# Patient Record
Sex: MALE | Race: WHITE | HISPANIC OR LATINO | Employment: FULL TIME | URBAN - METROPOLITAN AREA
[De-identification: names, ages, dates, MRNs, and addresses within clinical notes are randomized per-mention and may not be internally consistent; named-entity substitution may affect disease eponyms.]

---

## 2018-05-22 ENCOUNTER — SLEEP CENTER VISIT (OUTPATIENT)
Dept: SLEEP MEDICINE | Facility: MEDICAL CENTER | Age: 43
End: 2018-05-22
Payer: COMMERCIAL

## 2018-05-22 VITALS
HEART RATE: 69 BPM | BODY MASS INDEX: 31.78 KG/M2 | OXYGEN SATURATION: 97 % | WEIGHT: 227 LBS | SYSTOLIC BLOOD PRESSURE: 118 MMHG | HEIGHT: 71 IN | RESPIRATION RATE: 15 BRPM | DIASTOLIC BLOOD PRESSURE: 70 MMHG

## 2018-05-22 DIAGNOSIS — G47.00 INSOMNIA, UNSPECIFIED TYPE: ICD-10-CM

## 2018-05-22 DIAGNOSIS — G47.33 OSA (OBSTRUCTIVE SLEEP APNEA): ICD-10-CM

## 2018-05-22 PROCEDURE — 99203 OFFICE O/P NEW LOW 30 MIN: CPT | Performed by: NURSE PRACTITIONER

## 2018-05-22 RX ORDER — ZOLPIDEM TARTRATE 5 MG/1
5 TABLET ORAL NIGHTLY PRN
Qty: 3 TAB | Refills: 0 | Status: SHIPPED | OUTPATIENT
Start: 2018-05-22 | End: 2018-05-25

## 2018-05-22 RX ORDER — ESOMEPRAZOLE MAGNESIUM 40 MG/1
40 CAPSULE, DELAYED RELEASE ORAL
COMMUNITY

## 2018-05-22 RX ORDER — LISINOPRIL 10 MG/1
TABLET ORAL
Refills: 3 | COMMUNITY
Start: 2018-03-31

## 2018-05-22 RX ORDER — ATORVASTATIN CALCIUM 20 MG/1
TABLET, FILM COATED ORAL
Refills: 3 | COMMUNITY
Start: 2018-03-31

## 2018-05-22 NOTE — PROGRESS NOTES
Chief Complaint   Patient presents with   • New Patient     Dr. Chaparro        HPI:  Aniket Jason Callaway is a 42 y.o. year old male here today to establish care. He was referred by his PCP, Dr. Carter Chaparro to evaluate for sleep apnea. He had an overnight oximetry performed which per her report indicated borderline hypoxemia. I do not have records of this study. His BMI is 32. He has a history of snoring, witnessed apneas, non restorative sleep and daytime fatigue. He notes occasional morning headaches. Review of his sleep diary indicates that it often takes him 30 minutes to an hour to fall asleep. Once he falls asleep he wakes on average 1-2 times during the night. On his work days he will wake at 4 am. He is only averaging 4-5 hours of sleep on those nights. He is waking un refreshed and is tired during the day. He tries to limit naps. He states his insomnia is worse on the days he naps. He states he has had a history of insomnia and daytime fatigue for years. However it has been worse the past couple years. He denies symptoms to suggest Narcolepsy. He denies symptoms of RLS.       Past Medical History:   Diagnosis Date   • Apnea, sleep    • Daytime sleepiness    • Hyperlipidemia    • Hypertension    • Insomnia    • Morning headache    • Snoring     only when tired       History reviewed. No pertinent surgical history.    Family History   Problem Relation Age of Onset   • Sleep Apnea Neg Hx        Social History     Social History   • Marital status:      Spouse name: N/A   • Number of children: N/A   • Years of education: N/A     Occupational History   • Not on file.     Social History Main Topics   • Smoking status: Current Every Day Smoker     Packs/day: 0.25     Types: Cigarettes   • Smokeless tobacco: Never Used      Comment: 7-10 Q day    • Alcohol use Yes      Comment: 1 beer a week    • Drug use: No   • Sexual activity: Not on file     Other Topics Concern   • Not on file     Social History  "Narrative   • No narrative on file       ROS:  Constitutional: Denies fevers, chills, sweats, weight loss  Eyes: Denies vision loss, pain, drainage, double vision. Wears glasses   Ears/Nose/Mouth/Throat: Denies rhinitis, nasal congestion, ear ache, difficulty hearing, sore throat, persistent hoarseness, decayed teeth/toothache  Cardiovascular: Denies chest pain, tightness, palpitations, swelling in feet/legs, fainting, difficulty breathing when laying down  Respiratory: Denies shortness of breath, cough, sputum, wheezing, painful breathing, coughing up blood  GI: Denies heartburn, difficulty swallowing, nausea, vomiting, abdominal pain, diarrhea, constipation  : Denies frequent urination, painful urination  Integumentary: Denies rashes, lumps or color changes  MSK: Denies painful joints, sore muscles, and back pain.   Neurological: Denies frequent headaches, dizziness, weakness  Sleep: See HPI       Current Outpatient Prescriptions   Medication Sig Dispense Refill   • atorvastatin (LIPITOR) 20 MG Tab TAKE 1 TABLET BY MOUTH EVERY EVENING FOR YOUR CHOLESTEROL (90 DAY SUPPLY + 3 REFILLS)  3   • lisinopril (PRINIVIL) 10 MG Tab TAKE 1 TABLET BY MOUTH DAILY FOR LOWERING HIGH BLOOD PRESSURE (90 DAY SUPPLY + 3 REFILLS)  3   • esomeprazole (NEXIUM) 40 MG delayed-release capsule Take 40 mg by mouth every morning before breakfast.       No current facility-administered medications for this visit.        No Known Allergies    Blood pressure 118/70, pulse 69, resp. rate 15, height 1.791 m (5' 10.5\"), weight 103 kg (227 lb), SpO2 97 %.    PE:   Appearance: Well developed, well nourished, no acute distress  Eyes: PERRL, EOM intact, sclera white, conjunctiva moist  Ears: no lesions or deformities  Hearing: grossly intact  Nose: no lesions or deformities  Oropharynx: tongue normal, posterior pharynx without erythema or exudate  Mallampati Classification: Class 3   Neck: supple, trachea midline, no masses   Respiratory effort: no " intercostal retractions or use of accessory muscles  Lung auscultation: no rales, rhonchi or wheezes  Heart auscultation: no murmur rub or gallop  Extremities: no cyanosis or edema  Abdomen: soft ,non tender, no masses  Gait and Station: normal  Digits and nails: no clubbing, cyanosis, petechiae or nodes.  Cranial nerves: grossly intact  Skin: no rashes, lesions or ulcers noted  Orientation: Oriented to time, person and place  Mood and affect: mood and affect appropriate, normal interaction with examiner  Judgement: Intact          Assessment:    1. ROSA (obstructive sleep apnea)  POLYSOMNOGRAPHY, 4 OR MORE    zolpidem (AMBIEN) 5 MG Tab    Provisional diagnosis    2. BMI 32.0-32.9,adult  Patient identified as having weight management issue.  Appropriate orders and counseling given.   3. Insomnia, unspecified type           Plan:    1) Polysomnogram now to evaluate for sleep apnea. RX for Ambien provided to have on hand the night of the study. Pathophysiology of sleep apnea discussed in detail including various treatment options.  2) Sleep hygiene discussed in detail. Consider referral to [facility] for CBT therapy.   3) Weight loss recommended.  4) Follow up after Polysomnogram, sooner if needed.

## 2018-05-23 NOTE — PATIENT INSTRUCTIONS
Plan:    1) Polysomnogram now to evaluate for sleep apnea. RX for Ambien provided to have on hand the night of the study. Pathophysiology of sleep apnea discussed in detail including various treatment options.  2) Sleep hygiene discussed in detail. Consider referral to [facility] for CBT therapy.   3) Weight loss recommended.  4) Follow up after Polysomnogram, sooner if needed.

## 2018-06-18 ENCOUNTER — TELEPHONE (OUTPATIENT)
Dept: SLEEP MEDICINE | Facility: MEDICAL CENTER | Age: 43
End: 2018-06-18

## 2018-06-18 DIAGNOSIS — G47.30 SLEEP-DISORDERED BREATHING: ICD-10-CM

## 2018-06-28 ENCOUNTER — HOME STUDY (OUTPATIENT)
Dept: SLEEP MEDICINE | Facility: MEDICAL CENTER | Age: 43
End: 2018-06-28
Attending: NURSE PRACTITIONER
Payer: COMMERCIAL

## 2018-06-28 DIAGNOSIS — G47.30 SLEEP-DISORDERED BREATHING: ICD-10-CM

## 2018-06-28 PROCEDURE — 95806 SLEEP STUDY UNATT&RESP EFFT: CPT | Performed by: FAMILY MEDICINE

## 2018-08-21 NOTE — PROCEDURES
DIAGNOSTIC HOME SLEEP TEST (HST) REPORT       PATIENT ID:  NAME:  Aniket Gonzáles  MRN:               [id number]  YOB: 1975  DATE OF STUDY: 6/28/18      Impression:     This study shows evidence of:     1.Sever obstructive sleep apnea with  Respiratory Event Index (JOSUE) of 36.4 per hour and worse in supine sleep with JOSUE at 83/hr. These findings are based on the recording time (flow evaluation time). It is not possible with this device to determine a traditional apnea+hypopnea index (AHI) for total sleep time since EEG channels are not available.   O2 Sat. tony was 83% and mean O2 sat was 92% and baseline O2 at 93 %. O2 sat was below 90% for 14% of the flow evaluation time. Oxygen Desaturation (>=3%) Index was elevated at 35/hr.       TECHNICAL DESCRIPTION:  The [employer] Device used was a type-III home study device. Home sleep study recording included: Airflow recording by nasal pressure transducer; Respiratory Effort by abdominal Respiratory Bands; O2 by finger oximetry. A position sensor and a snore channel was also used.    Scoring Criteria: A modification of the the AASM Manual for the Scoring of Sleep and Associated Events, 2012, was used.   Obstructive apnea was scored by cessation of airflow for at least 10 seconds with continuing respiratory effort.  Central apnea was scored by cessation of airflow for at least 10 seconds with no effort.  Hypopnea was scored by a 30% or more reduction in airflow for at least 10 seconds accompanied by an arterial oxygen desaturation of 3% or more.  (For Medicare patients, hypopneas were scored by a 30% or more reduction in airflow for at least 10 seconds accompanied by an arterial oxygen saturation of 4% or more, as required by their insurance, CMS.        General sleep summary: . Total recording time is 7 hours and 54 minutes and total flow evaluation time is 7 hours and 42 minutes. The patient spent 0 hours and 25 minutes in the supine position and 7  hours and 16 minutes in the nonsupine position.    Respiratory events:    Apneas: 62 (Obstructive apnea index 0.6/hr, Central apnea index 7 /hr, mixed 0 /hour)  Hypopneas: 219    Recommendations:    1. CPAP titration study vs Auto CPAP trial .   2.   In general patients with sleep apnea are advised to avoid alcohol and sedatives and to not operate a motor vehicle while drowsy. In some cases alternative treatment options may prove effective in resolving sleep apnea in these options include upper airway surgery, the use of a dental orthotic or weight loss and positional therapy. Clinical correlation is required.         Katt Wood MD

## 2018-09-18 ENCOUNTER — SLEEP CENTER VISIT (OUTPATIENT)
Dept: SLEEP MEDICINE | Facility: MEDICAL CENTER | Age: 43
End: 2018-09-18
Payer: COMMERCIAL

## 2018-09-18 VITALS
RESPIRATION RATE: 16 BRPM | HEIGHT: 71 IN | SYSTOLIC BLOOD PRESSURE: 124 MMHG | BODY MASS INDEX: 32.2 KG/M2 | HEART RATE: 84 BPM | OXYGEN SATURATION: 96 % | DIASTOLIC BLOOD PRESSURE: 66 MMHG | TEMPERATURE: 98.6 F | WEIGHT: 230 LBS

## 2018-09-18 DIAGNOSIS — G47.33 OSA (OBSTRUCTIVE SLEEP APNEA): ICD-10-CM

## 2018-09-18 PROCEDURE — 99213 OFFICE O/P EST LOW 20 MIN: CPT | Performed by: NURSE PRACTITIONER

## 2018-09-18 NOTE — PROGRESS NOTES
Chief Complaint   Patient presents with   • Apnea     last seen 5/22/18    • Results     HST 6/28/18        HPI:  Aniket Jason Callaway is a 42 y.o. year old male here today for follow-up on results of his home sleep study. He was initially referred by his PCP, Dr. Carter Chaparro to evaluate for sleep apnea. He had an overnight oximetry performed which per her report indicated borderline hypoxemia. I do not have records of this study. His BMI is 32. He has a history of snoring, witnessed apneas, non restorative sleep and daytime fatigue. He notes occasional morning headaches.  He states he has had a history of insomnia and daytime fatigue for years. However it has been worse the past couple years.  Home sleep study was completed 6/28/2018 and indicates evidence of severe obstructive sleep apnea with an AHI of 36.4, supine index of 83.6 with a low 02 of 83%.   He has been working on limiting naps and walking more routinely. He is still waking un refreshed and is tired during the day.       Past Medical History:   Diagnosis Date   • Apnea, sleep    • Daytime sleepiness    • Hyperlipidemia    • Hypertension    • Insomnia    • Morning headache    • Snoring     only when tired       History reviewed. No pertinent surgical history.    Family History   Problem Relation Age of Onset   • Sleep Apnea Neg Hx        Social History     Social History   • Marital status:      Spouse name: N/A   • Number of children: N/A   • Years of education: N/A     Occupational History   • Not on file.     Social History Main Topics   • Smoking status: Current Every Day Smoker     Packs/day: 0.25     Years: 25.00     Types: Cigarettes   • Smokeless tobacco: Never Used      Comment: 7-10 Q day    • Alcohol use 0.6 oz/week     1 Cans of beer per week      Comment: 1 beer a week    • Drug use: No   • Sexual activity: Not on file     Other Topics Concern   • Not on file     Social History Narrative   • No narrative on file  "      ROS:  Constitutional: Denies fevers, chills, sweats, weight loss  Eyes: Denies vision loss, pain, drainage, double vision. Wears glasses   Ears/Nose/Mouth/Throat: Denies rhinitis, nasal congestion, ear ache, difficulty hearing, sore throat, persistent hoarseness, decayed teeth/toothache  Cardiovascular: Denies chest pain, tightness, palpitations, swelling in feet/legs, fainting, difficulty breathing when laying down  Respiratory: Denies shortness of breath, cough, sputum, wheezing, painful breathing, coughing up blood  GI: Denies heartburn, difficulty swallowing, nausea, vomiting, abdominal pain, diarrhea, constipation  : Denies frequent urination, painful urination  Integumentary: Denies rashes, lumps or color changes  MSK: Denies painful joints, sore muscles, and back pain.   Neurological: Denies frequent headaches, dizziness, weakness  Sleep: See HPI       Current Outpatient Prescriptions   Medication Sig Dispense Refill   • atorvastatin (LIPITOR) 20 MG Tab TAKE 1 TABLET BY MOUTH EVERY EVENING FOR YOUR CHOLESTEROL (90 DAY SUPPLY + 3 REFILLS)  3   • lisinopril (PRINIVIL) 10 MG Tab TAKE 1 TABLET BY MOUTH DAILY FOR LOWERING HIGH BLOOD PRESSURE (90 DAY SUPPLY + 3 REFILLS)  3   • esomeprazole (NEXIUM) 40 MG delayed-release capsule Take 40 mg by mouth.       No current facility-administered medications for this visit.        No Known Allergies    Blood pressure 124/66, pulse 84, temperature 37 °C (98.6 °F), resp. rate 16, height 1.791 m (5' 10.5\"), weight 104.3 kg (230 lb), SpO2 96 %.    PE:   Appearance: Well developed, well nourished, no acute distress  Eyes: PERRL, EOM intact, sclera white, conjunctiva moist  Ears: no lesions or deformities  Hearing: grossly intact  Nose: no lesions or deformities  Oropharynx: tongue normal, posterior pharynx without erythema or exudate  Mallampati Classification: Class 3   Neck: supple, trachea midline, no masses   Respiratory effort: no intercostal retractions or use of " accessory muscles  Lung auscultation: no rales, rhonchi or wheezes  Heart auscultation: no murmur rub or gallop  Extremities: no cyanosis or edema  Abdomen: soft ,non tender, no masses  Gait and Station: normal  Digits and nails: no clubbing, cyanosis, petechiae or nodes.  Cranial nerves: grossly intact  Skin: no rashes, lesions or ulcers noted  Orientation: Oriented to time, person and place  Mood and affect: mood and affect appropriate, normal interaction with examiner  Judgement: Intact          Assessment:    1. ROSA (obstructive sleep apnea)  DME CPAP   2. BMI 32.0-32.9,adult           Plan:      1) Reviewed sleep study in detail. Discussed pathophysiology of sleep apnea and various treatment options in detail. He is amendable to a trial of airway pressurization. Order for Auto CPAP at 5-15 CM H20. Handout provided on sleep apnea.  2) Sleep hygiene discussed.   3) Weight loss recommended.   4) Follow up in 8 weeks with compliance card download, sooner if needed.

## 2018-11-19 PROBLEM — G47.33 OSA (OBSTRUCTIVE SLEEP APNEA): Status: ACTIVE | Noted: 2018-11-19

## 2018-11-19 PROBLEM — Z72.0 TOBACCO ABUSE: Status: ACTIVE | Noted: 2018-11-19

## 2018-11-19 PROBLEM — E66.9 OBESITY: Status: ACTIVE | Noted: 2018-11-19

## 2018-11-19 NOTE — PROGRESS NOTES
CC: Follow up for sleep disturbance    HPI:  Mr. Aniket Jason Callaway is a 42-year-old male last seen 9/18/2018 when auto titrating CPAP 5-15 cm water was initiated.  This is his clinical and compliance first visit.  He is home sleep study was performed on 6/20/2018 and showed an AHI of 36.4, a supine index of 83.6, and a tony saturation of 83%.  His initial presenting complaints included snoring, witnessed apneas, nonrestorative sleep, and daytime fatigue with occasional morning headaches.    He initially presented on 5/22/2018 with nocturnal hypoxemia, snoring, witnessed apneas, nonrestorative sleep, and daytime fatigue.    His machine does not have a compliance card, we will try a wireless download.  However, he is not been using his machine because he feels the pressures are too high.      Patient Active Problem List    Diagnosis Date Noted   • ROSA (obstructive sleep apnea) 11/19/2018   • Obesity 11/19/2018   • Tobacco abuse 11/19/2018       Past Medical History:   Diagnosis Date   • Apnea, sleep    • Daytime sleepiness    • Hyperlipidemia    • Hypertension    • Insomnia    • Morning headache    • Snoring     only when tired        History reviewed. No pertinent surgical history.    Family History   Problem Relation Age of Onset   • Sleep Apnea Neg Hx        Social History     Social History   • Marital status:      Spouse name: N/A   • Number of children: N/A   • Years of education: N/A     Occupational History   • Not on file.     Social History Main Topics   • Smoking status: Current Every Day Smoker     Packs/day: 0.25     Years: 25.00     Types: Cigarettes   • Smokeless tobacco: Never Used      Comment: 7-10 Q day    • Alcohol use 0.6 oz/week     1 Cans of beer per week      Comment: 1 beer a week    • Drug use: No   • Sexual activity: Not on file     Other Topics Concern   • Not on file     Social History Narrative   • No narrative on file       Current Outpatient Prescriptions   Medication Sig  "Dispense Refill   • atorvastatin (LIPITOR) 20 MG Tab TAKE 1 TABLET BY MOUTH EVERY EVENING FOR YOUR CHOLESTEROL (90 DAY SUPPLY + 3 REFILLS)  3   • lisinopril (PRINIVIL) 10 MG Tab TAKE 1 TABLET BY MOUTH DAILY FOR LOWERING HIGH BLOOD PRESSURE (90 DAY SUPPLY + 3 REFILLS)  3   • esomeprazole (NEXIUM) 40 MG delayed-release capsule Take 40 mg by mouth.       No current facility-administered medications for this visit.     \"CURRENT RX\"    ALLERGIES: Patient has no known allergies.    ROS  HEENT: Wears glasses  Constitutional: Denies fever, chills, sweats,  weight loss, fatigue  Cardiovascular: Denies chest pain, tightness, palpitations, swelling in legs/feet, fainting, difficulty breathing when lying down but gets better when sitting up.   Respiratory: Denies shortness of breath, cough, sputum, wheezing, painful breathing, coughing up blood.   Sleep: per HPI  Gastrointestinal: Denies  difficulty swallowing, nausea, abdominal pain, diarrhea, constipation, heartburn..   Musculoskeletal: Denies painful joints, sore muscles, back pain.        PHYSICAL EXAM  Obese    /70 (BP Location: Right arm, Patient Position: Sitting, BP Cuff Size: Large adult)   Pulse 93   Resp 16   Ht 1.791 m (5' 10.5\")   Wt 103.9 kg (229 lb)   SpO2 94%   BMI 32.39 kg/m²   Appearance: Well-nourished, well-developed, no acute distress  Eyes:  PERRLA, EOMI  ENMT: without lesions, deformities;hearing grossly intact; tongue normal, posterior pharynx without erythema or exudate; Mallampati classification: 3  Neck: Supple, trachea midline, no masses  Respiratory effort:  No intercostal retractions or use of accessory muscles  Lung auscultation:  No wheezes rhonchi rubs or rales  Cardiac: No murmurs, rubs, or gallops; regular rhythm, normal rate; no edema  Abdomen:  No tenderness, no organomegaly.  Obese  Musculoskeletal:  Grossly normal; gait and station normal; digits and nails normal  Skin:  No rashes, petechiae, cyanosis  Neurologic: without focal " signs; oriented to person, time, place, and purpose; judgement intact  Psychiatric:  No depression, anxiety, agitation        PROBLEMS:  1. ROSA (obstructive sleep apnea)    - DME CPAP    2. Class 1 obesity with body mass index (BMI) of 32.0 to 32.9 in adult, unspecified obesity type, unspecified whether serious comorbidity present    - OBESITY COUNSELING (No Charge): Patient identified as having weight management issue.  Appropriate orders and counseling given.    3. Tobacco abuse      4. Need for influenza vaccination        PLAN:     Return in about 10 weeks (around 1/29/2019).    We will empirically reduce his titrating CPAP to 4-9 cm H2O, clean equipment frequently, and get new mask and supplies as allowed by insurance and DME. Advised about potential problems including nasal obstruction/stuffiness, mask leak or discomfort , frequent awakenings, chill or dryness of the upper airway, noise, inconvenience, and lack of benefit. Recommend an earlier appointment, if significant treatment barriers develop.    Should the empiric change auto CPAP change to 4 to 9 cm water proven ineffective, the patient may need to have an attended dedicated full night positive airway pressure titration.

## 2018-11-20 ENCOUNTER — SLEEP CENTER VISIT (OUTPATIENT)
Dept: SLEEP MEDICINE | Facility: MEDICAL CENTER | Age: 43
End: 2018-11-20
Payer: COMMERCIAL

## 2018-11-20 VITALS
HEART RATE: 93 BPM | DIASTOLIC BLOOD PRESSURE: 70 MMHG | BODY MASS INDEX: 32.06 KG/M2 | OXYGEN SATURATION: 94 % | WEIGHT: 229 LBS | RESPIRATION RATE: 16 BRPM | SYSTOLIC BLOOD PRESSURE: 120 MMHG | HEIGHT: 71 IN

## 2018-11-20 DIAGNOSIS — Z72.0 TOBACCO ABUSE: ICD-10-CM

## 2018-11-20 DIAGNOSIS — Z23 NEED FOR INFLUENZA VACCINATION: ICD-10-CM

## 2018-11-20 DIAGNOSIS — E66.9 CLASS 1 OBESITY WITH BODY MASS INDEX (BMI) OF 32.0 TO 32.9 IN ADULT, UNSPECIFIED OBESITY TYPE, UNSPECIFIED WHETHER SERIOUS COMORBIDITY PRESENT: ICD-10-CM

## 2018-11-20 DIAGNOSIS — G47.33 OSA (OBSTRUCTIVE SLEEP APNEA): ICD-10-CM

## 2018-11-20 PROCEDURE — 99213 OFFICE O/P EST LOW 20 MIN: CPT | Performed by: INTERNAL MEDICINE

## 2019-02-01 ENCOUNTER — SLEEP CENTER VISIT (OUTPATIENT)
Dept: SLEEP MEDICINE | Facility: MEDICAL CENTER | Age: 44
End: 2019-02-01
Payer: COMMERCIAL

## 2019-02-01 VITALS
BODY MASS INDEX: 32.2 KG/M2 | DIASTOLIC BLOOD PRESSURE: 88 MMHG | HEART RATE: 90 BPM | WEIGHT: 230 LBS | RESPIRATION RATE: 16 BRPM | SYSTOLIC BLOOD PRESSURE: 122 MMHG | OXYGEN SATURATION: 94 % | HEIGHT: 71 IN

## 2019-02-01 DIAGNOSIS — Z72.0 TOBACCO ABUSE: ICD-10-CM

## 2019-02-01 DIAGNOSIS — G47.33 OSA (OBSTRUCTIVE SLEEP APNEA): ICD-10-CM

## 2019-02-01 DIAGNOSIS — Z78.9 INTOLERANCE OF CONTINUOUS POSITIVE AIRWAY PRESSURE (CPAP) VENTILATION: ICD-10-CM

## 2019-02-01 DIAGNOSIS — Z23 NEED FOR INFLUENZA VACCINATION: ICD-10-CM

## 2019-02-01 DIAGNOSIS — E66.9 CLASS 1 OBESITY WITH BODY MASS INDEX (BMI) OF 32.0 TO 32.9 IN ADULT, UNSPECIFIED OBESITY TYPE, UNSPECIFIED WHETHER SERIOUS COMORBIDITY PRESENT: ICD-10-CM

## 2019-02-01 PROCEDURE — 99214 OFFICE O/P EST MOD 30 MIN: CPT | Performed by: INTERNAL MEDICINE

## 2019-02-01 NOTE — PROGRESS NOTES
CC: 10-week follow-up after the initiation of auto titrating CPAP 4-9 cm H2O    HPI:  Mr. Aniket Jason Callaway is a 42-year-old male who works for [employer] last seen 11/20/2018 when auto titrating CPAP 5-15 cm water was changed to 4-9 cm H2O empirically because of pressure intolerance.     His home sleep study was performed on 6/20/2018 and showed an AHI of 36.4, a supine index of 83.6, and a tony saturation of 83%.  His initial presenting complaints included snoring, witnessed apneas, nonrestorative sleep, and daytime fatigue with occasional morning headaches.    Despite the drop in pressure the patient could not get used to using a mask machine and therefore returned it.  We have discussed mandibular advancement devices and have given him contact information for the dental referral.        Patient Active Problem List    Diagnosis Date Noted   • Intolerance of continuous positive airway pressure (CPAP) ventilation 02/01/2019   • ROSA (obstructive sleep apnea) 11/19/2018   • Obesity 11/19/2018   • Tobacco abuse 11/19/2018       Past Medical History:   Diagnosis Date   • Apnea, sleep    • Daytime sleepiness    • Hyperlipidemia    • Hypertension    • Insomnia    • Morning headache    • Snoring     only when tired        History reviewed. No pertinent surgical history.    Family History   Problem Relation Age of Onset   • Sleep Apnea Neg Hx        Social History     Social History   • Marital status:      Spouse name: N/A   • Number of children: N/A   • Years of education: N/A     Occupational History   • Not on file.     Social History Main Topics   • Smoking status: Current Every Day Smoker     Packs/day: 0.25     Years: 25.00     Types: Cigarettes   • Smokeless tobacco: Never Used      Comment: 7-10 Q day    • Alcohol use 0.6 oz/week     1 Cans of beer per week      Comment: 1 beer a week    • Drug use: No   • Sexual activity: Not on file     Other Topics Concern   • Not on file     Social  "History Narrative   • No narrative on file       Current Outpatient Prescriptions   Medication Sig Dispense Refill   • atorvastatin (LIPITOR) 20 MG Tab TAKE 1 TABLET BY MOUTH EVERY EVENING FOR YOUR CHOLESTEROL (90 DAY SUPPLY + 3 REFILLS)  3   • lisinopril (PRINIVIL) 10 MG Tab TAKE 1 TABLET BY MOUTH DAILY FOR LOWERING HIGH BLOOD PRESSURE (90 DAY SUPPLY + 3 REFILLS)  3   • esomeprazole (NEXIUM) 40 MG delayed-release capsule Take 40 mg by mouth.       No current facility-administered medications for this visit.     \"CURRENT RX\"    ALLERGIES: Patient has no known allergies.    ROS    Constitutional: Denies fever, chills, sweats,  weight loss, fatigue  Cardiovascular: Denies chest pain, tightness, palpitations, swelling in legs/feet, fainting, difficulty breathing when lying down but gets better when sitting up.   Respiratory: Denies shortness of breath, cough, sputum, wheezing, painful breathing, coughing up blood.   Sleep: per HPI  Gastrointestinal: Denies  difficulty swallowing, nausea, abdominal pain, diarrhea, constipation, heartburn.  Musculoskeletal: Denies painful joints, sore muscles, back pain.        PHYSICAL EXAM  Obese    /88 (BP Location: Right arm, Patient Position: Sitting, BP Cuff Size: Adult)   Pulse 90   Resp 16   Ht 1.791 m (5' 10.5\")   Wt 104.3 kg (230 lb)   SpO2 94%   BMI 32.54 kg/m²   Appearance: Well-nourished, well-developed, no acute distress  Eyes:  PERRLA, EOMI  ENMT: without lesions, deformities;hearing grossly intact; tongue normal, posterior pharynx without erythema or exudate; Mallampati classification: 3  Neck: Supple, trachea midline, no masses  Respiratory effort:  No intercostal retractions or use of accessory muscles  Lung auscultation:  No wheezes rhonchi rubs or rales  Cardiac: No murmurs, rubs, or gallops; regular rhythm, normal rate; no edema  Abdomen:  No tenderness, no organomegaly.  Obese  Musculoskeletal:  Grossly normal; gait and station normal; digits and nails " normal  Skin:  No rashes, petechiae, cyanosis  Neurologic: without focal signs; oriented to person, time, place, and purpose; judgement intact  Psychiatric:  No depression, anxiety, agitation        PROBLEMS:  1. ROSA (obstructive sleep apnea)      2. Class 1 obesity with body mass index (BMI) of 32.0 to 32.9 in adult, unspecified obesity type, unspecified whether serious comorbidity present      3. Tobacco abuse      4. Need for influenza vaccination      5. Intolerance of continuous positive airway pressure (CPAP) ventilation        PLAN:   The patient has moderately severe obstructive sleep apnea syndrome but sadly was CPAP intolerant.    We will refer him to dentistry for application of an mandibular advancement device.  Once the device is fully adjusted and deployed, he will return for reevaluation.  If all is going well we will order a home sleep apnea test while he wears his oral appliance to determine and  its efficacy.    Return in 4 months.

## 2019-05-14 NOTE — PROGRESS NOTES
CC: Follow up for severe ROSA with an AHI 36.4    HPI:  Mr. Aniket Jason Callaway is a 42-year-old male who works for [employer] last seen 2/1/2019 when he was referred to dentistry for a mandibular positioning device.    His home sleep study was performed on 6/20/2018 and showed an AHI of 36.4, a supine index of 83.6, and a tony saturation of 83%.  His initial presenting complaints included snoring, witnessed apneas, nonrestorative sleep, and daytime fatigue with occasional morning headaches.     Despite the drop in pressure the patient could not get used to using a mask machine and therefore returned it.  Consequently we referred him to dentistry.    Significant comorbidities and modifying factors include dyslipidemia, gastroesophageal reflux, hypertension, obesity, insomnia, and tobacco abuse.      Patient Active Problem List    Diagnosis Date Noted   • Intolerance of continuous positive airway pressure (CPAP) ventilation 02/01/2019   • ROSA (obstructive sleep apnea) 11/19/2018   • Obesity 11/19/2018   • Tobacco abuse 11/19/2018       Past Medical History:   Diagnosis Date   • Apnea, sleep    • Daytime sleepiness    • Hyperlipidemia    • Hypertension    • Insomnia    • Morning headache    • Snoring     only when tired        No past surgical history on file.    Family History   Problem Relation Age of Onset   • Sleep Apnea Neg Hx        Social History     Social History   • Marital status:      Spouse name: N/A   • Number of children: N/A   • Years of education: N/A     Occupational History   • Not on file.     Social History Main Topics   • Smoking status: Current Every Day Smoker     Packs/day: 0.25     Years: 25.00     Types: Cigarettes   • Smokeless tobacco: Never Used      Comment: 7-10 Q day    • Alcohol use 0.6 oz/week     1 Cans of beer per week      Comment: 1 beer a week    • Drug use: No   • Sexual activity: Not on file     Other Topics Concern   • Not on file     Social History  "Narrative   • No narrative on file       Current Outpatient Prescriptions   Medication Sig Dispense Refill   • atorvastatin (LIPITOR) 20 MG Tab TAKE 1 TABLET BY MOUTH EVERY EVENING FOR YOUR CHOLESTEROL (90 DAY SUPPLY + 3 REFILLS)  3   • lisinopril (PRINIVIL) 10 MG Tab TAKE 1 TABLET BY MOUTH DAILY FOR LOWERING HIGH BLOOD PRESSURE (90 DAY SUPPLY + 3 REFILLS)  3   • esomeprazole (NEXIUM) 40 MG delayed-release capsule Take 40 mg by mouth.       No current facility-administered medications for this visit.     \"CURRENT RX\"    ALLERGIES: Patient has no known allergies.    ROS  ***  Constitutional: Denies fever, chills, sweats,  weight loss, fatigue  Cardiovascular: Denies chest pain, tightness, palpitations, swelling in legs/feet, fainting, difficulty breathing when lying down but gets better when sitting up.   Respiratory: Denies shortness of breath, cough, sputum, wheezing, painful breathing, coughing up blood.   Sleep: per HPI  Gastrointestinal: Denies  difficulty swallowing, nausea, abdominal pain, diarrhea, constipation, heartburn.  Musculoskeletal: Denies painful joints, sore muscles, back pain.        PHYSICAL EXAM  ***    There were no vitals taken for this visit.  Appearance: Well-nourished, well-developed, no acute distress  Eyes:  PERRLA, EOMI  ENMT: without lesions, deformities;hearing grossly intact; tongue normal, posterior pharynx without erythema or exudate; Mallampati classification:   Neck: Supple, trachea midline, no masses  Respiratory effort:  No intercostal retractions or use of accessory muscles  Lung auscultation:  No wheezes rhonchi rubs or rales  Cardiac: No murmurs, rubs, or gallops; regular rhythm, normal rate; no edema  Abdomen:  No tenderness, no organomegaly  Musculoskeletal:  Grossly normal; gait and station normal; digits and nails normal  Skin:  No rashes, petechiae, cyanosis  Neurologic: without focal signs; oriented to person, time, place, and purpose; judgement intact  Psychiatric:  No " depression, anxiety, agitation        PROBLEMS:  ***  1. ROSA (obstructive sleep apnea)  ***    2. Class 1 obesity with body mass index (BMI) of 32.0 to 32.9 in adult, unspecified obesity type, unspecified whether serious comorbidity present  ***    3. Tobacco abuse  ***    4. Intolerance of continuous positive airway pressure (CPAP) ventilation  ***      PLAN:   ***  No Follow-up on file.    Has been advised to continue the current *** , clean equipment frequently, and get new mask and supplies as allowed by insurance and DME. Advised about potential problems including nasal obstruction/stuffiness, mask leak or discomfort , frequent awakenings, chill or dryness of the upper airway, noise, inconvenience, and lack of benefit. Recommend an earlier appointment, if significant treatment barriers develop.    Have advised the patient to follow up with the appropriate healthcare practitioners for all other medical problems and issues.

## 2019-05-20 ENCOUNTER — APPOINTMENT (OUTPATIENT)
Dept: SLEEP MEDICINE | Facility: MEDICAL CENTER | Age: 44
End: 2019-05-20
Payer: COMMERCIAL

## 2020-12-27 ENCOUNTER — HOSPITAL ENCOUNTER (EMERGENCY)
Dept: HOSPITAL 8 - ED | Age: 45
Discharge: HOME | End: 2020-12-27
Payer: COMMERCIAL

## 2020-12-27 VITALS — DIASTOLIC BLOOD PRESSURE: 62 MMHG | SYSTOLIC BLOOD PRESSURE: 123 MMHG

## 2020-12-27 VITALS — BODY MASS INDEX: 32.38 KG/M2 | HEIGHT: 70 IN | WEIGHT: 226.19 LBS

## 2020-12-27 DIAGNOSIS — B34.9: Primary | ICD-10-CM

## 2020-12-27 DIAGNOSIS — Z20.828: ICD-10-CM

## 2020-12-27 PROCEDURE — 71045 X-RAY EXAM CHEST 1 VIEW: CPT

## 2020-12-27 PROCEDURE — 87635 SARS-COV-2 COVID-19 AMP PRB: CPT

## 2020-12-27 PROCEDURE — 99284 EMERGENCY DEPT VISIT MOD MDM: CPT

## 2021-09-03 VITALS
TEMPERATURE: 97.8 F | RESPIRATION RATE: 17 BRPM | DIASTOLIC BLOOD PRESSURE: 97 MMHG | OXYGEN SATURATION: 96 % | BODY MASS INDEX: 32.48 KG/M2 | HEART RATE: 75 BPM | SYSTOLIC BLOOD PRESSURE: 142 MMHG | WEIGHT: 232 LBS | HEIGHT: 71 IN

## 2021-09-03 PROCEDURE — 99283 EMERGENCY DEPT VISIT LOW MDM: CPT

## 2021-09-04 ENCOUNTER — HOSPITAL ENCOUNTER (EMERGENCY)
Facility: MEDICAL CENTER | Age: 46
End: 2021-09-04
Attending: EMERGENCY MEDICINE | Admitting: EMERGENCY MEDICINE
Payer: COMMERCIAL

## 2021-09-04 DIAGNOSIS — S05.01XA ABRASION OF RIGHT CORNEA, INITIAL ENCOUNTER: ICD-10-CM

## 2021-09-04 PROCEDURE — 700101 HCHG RX REV CODE 250: Performed by: EMERGENCY MEDICINE

## 2021-09-04 RX ORDER — PROPARACAINE HYDROCHLORIDE 5 MG/ML
1 SOLUTION/ DROPS OPHTHALMIC ONCE
Status: COMPLETED | OUTPATIENT
Start: 2021-09-04 | End: 2021-09-04

## 2021-09-04 RX ORDER — ERYTHROMYCIN 5 MG/G
1 OINTMENT OPHTHALMIC 4 TIMES DAILY
Qty: 1 G | Refills: 0 | Status: SHIPPED | OUTPATIENT
Start: 2021-09-04

## 2021-09-04 RX ORDER — ERYTHROMYCIN 5 MG/G
1 OINTMENT OPHTHALMIC 4 TIMES DAILY
Qty: 1 G | Refills: 0 | Status: SHIPPED | OUTPATIENT
Start: 2021-09-04 | End: 2021-09-04 | Stop reason: SDUPTHER

## 2021-09-04 RX ADMIN — FLUORESCEIN SODIUM 1 MG: 1 STRIP OPHTHALMIC at 01:00

## 2021-09-04 RX ADMIN — PROPARACAINE HYDROCHLORIDE 1 DROP: 5 SOLUTION/ DROPS OPHTHALMIC at 01:00

## 2021-09-04 ASSESSMENT — LIFESTYLE VARIABLES
HAVE PEOPLE ANNOYED YOU BY CRITICIZING YOUR DRINKING: NO
TOTAL SCORE: 0
EVER HAD A DRINK FIRST THING IN THE MORNING TO STEADY YOUR NERVES TO GET RID OF A HANGOVER: NO
TOTAL SCORE: 0
CONSUMPTION TOTAL: INCOMPLETE
DO YOU DRINK ALCOHOL: NO
TOTAL SCORE: 0
HAVE YOU EVER FELT YOU SHOULD CUT DOWN ON YOUR DRINKING: NO
EVER FELT BAD OR GUILTY ABOUT YOUR DRINKING: NO
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
## 2021-09-04 NOTE — ED TRIAGE NOTES
Aniket Jason Callaway    Chief Complaint   Patient presents with   • Eye Pain     x 3 months. PT denies trauma or known foreign body. PTs eye is red with some discharge from R eye.        Vitals:    09/03/21 2000   BP: 157/100   Pulse: 81   Resp: 16   Temp: 36.7 °C (98.1 °F)   SpO2: 98%       PT states he can still see out of the eye. Does have some light sensitivity. PT ambulatory to triage with a steady gait. PT told to inform staff of any changes.

## 2021-09-04 NOTE — ED NOTES
Attempted to discharge-- pt requesting prescription to be sent to alternate pharmacy-- EDP notified